# Patient Record
Sex: MALE | Race: WHITE | HISPANIC OR LATINO | URBAN - METROPOLITAN AREA
[De-identification: names, ages, dates, MRNs, and addresses within clinical notes are randomized per-mention and may not be internally consistent; named-entity substitution may affect disease eponyms.]

---

## 2023-01-19 ENCOUNTER — OFFICE VISIT (OUTPATIENT)
Dept: FAMILY MEDICINE CLINIC | Facility: CLINIC | Age: 38
End: 2023-01-19

## 2023-01-19 VITALS
BODY MASS INDEX: 28.7 KG/M2 | HEART RATE: 92 BPM | WEIGHT: 189.4 LBS | SYSTOLIC BLOOD PRESSURE: 122 MMHG | TEMPERATURE: 96.9 F | RESPIRATION RATE: 18 BRPM | HEIGHT: 68 IN | OXYGEN SATURATION: 99 % | DIASTOLIC BLOOD PRESSURE: 70 MMHG

## 2023-01-19 DIAGNOSIS — Z13.1 SCREENING FOR DIABETES MELLITUS (DM): ICD-10-CM

## 2023-01-19 DIAGNOSIS — R03.0 ELEVATED BLOOD PRESSURE READING: ICD-10-CM

## 2023-01-19 DIAGNOSIS — Z11.59 NEED FOR HEPATITIS C SCREENING TEST: ICD-10-CM

## 2023-01-19 DIAGNOSIS — K21.9 GASTROESOPHAGEAL REFLUX DISEASE, UNSPECIFIED WHETHER ESOPHAGITIS PRESENT: Primary | ICD-10-CM

## 2023-01-19 DIAGNOSIS — Z11.4 SCREENING FOR HIV (HUMAN IMMUNODEFICIENCY VIRUS): ICD-10-CM

## 2023-01-19 DIAGNOSIS — Z13.220 SCREENING FOR HYPERLIPIDEMIA: ICD-10-CM

## 2023-01-19 RX ORDER — OMEPRAZOLE 20 MG/1
20 CAPSULE, DELAYED RELEASE ORAL DAILY
Qty: 90 CAPSULE | Refills: 0 | Status: SHIPPED | OUTPATIENT
Start: 2023-01-19

## 2023-01-19 NOTE — ASSESSMENT & PLAN NOTE
Assessment:   · Blood pressure at today’s office visit 122/80 mmHg, controlled  · Blood Pressure goal as per JNC-8 less than 140/90 mmHg,       Plan:   · Will continue to monitor BP  · BP goals reviewed with patient  · Educate on monitoring  blood pressure at home daily in a quiet room; after resting for at least 5 minutes and to bring the logs at next visit  · Will recommend performing aerobic exercise for at least more than 3 times per week  · Will recommend sodium and fat reduction and  Increase  in fruit consumption  · Reassess BP at next visit in 3 months

## 2023-01-19 NOTE — PROGRESS NOTES
Name: Augustus Dorantes      : 1985      MRN: [id number]  Encounter Provider: Alvaro Jones MD  Encounter Date: 2023   Encounter department: [address] [place] [address]     1  Gastroesophageal reflux disease, unspecified whether esophagitis present  Assessment & Plan:  Patient has chronic history of GERD  · Currently not treated  · Positive for nausea, dyspepsia, bloating heartburn and acid regurgitation, abdominal tenderness to palpation  · No red flags  DDX:  · Acute coronary syndrome: negative for dyspoiesis, dyspnea, the pain does not radiates to the jaw, arm, or neck  · Stable angina: pain is not precipitated by exertion or relieved by rest, currently asymptomatic, Hx of GERD, is nonsmoker, bp wnl  · Peptic ulcer disease: denies hx of sick contact, positive test for H  Pylori, pain occurs with meals  Treatment:   · The patient was encourage to continue his lifestyle modifications that includes don loss, healthy meals low in fat, cont  head of bed elevation, avoidance of late night meals  · Advised to raise the HOB, 6 inches, at night  The legs of th head board can be raised using books, blocks or bricks  · Will start a trial of omeprazole 20 mg qd po  · Reassess in 3 months  · H  Pylori test ordered  · Ef symptoms does not resolve, consider increasing medication vs EGD referral   · Reassess in 3 months      Orders:  -     omeprazole (PriLOSEC) 20 mg delayed release capsule; Take 1 capsule (20 mg total) by mouth daily  -     Comprehensive metabolic panel; Future  -     H  pylori antigen, stool; Future    2  Screening for diabetes mellitus (DM)  -     Comprehensive metabolic panel; Future    3  Screening for hyperlipidemia  -     Lipid panel; Future    4  Need for hepatitis C screening test  -     Hepatitis C antibody; Future    5   Screening for HIV (human immunodeficiency virus)  -     HIV 1/2 AG/AB w Reflex [facility] for 2 yr old and above; Future    6  Elevated blood pressure reading  Assessment & Plan:  Assessment:   · Blood pressure at today’s office visit 122/80 mmHg, controlled  · Blood Pressure goal as per JNC-8 less than 140/90 mmHg,       Plan:   · Will continue to monitor BP  · BP goals reviewed with patient  · Educate on monitoring  blood pressure at home daily in a quiet room; after resting for at least 5 minutes and to bring the logs at next visit  · Will recommend performing aerobic exercise for at least more than 3 times per week  · Will recommend sodium and fat reduction and  Increase  in fruit consumption  · Reassess BP at next visit in 3 months  Depression Screening and Follow-up Plan: Patient was screened for depression during today's encounter  They screened negative with a PHQ-2 score of 0  Subjective      It was a pleasure to see Venancio Menjivar is a 44 y o   male with no medical conditions, who presents to [facility]  Last visit with his PCP was 1 year ago, [place], moved to [place] a year ago  Today's main complain:     1  Acid reflux, please see below  Denies unintentional weight loss, no family hx of cancer/IBD/IBS,  fever, chills, fatigue, weakness, headaches, dizziness, rashes, blurred vision, nausea, palpitation, chest pain, SOB, abdominal pain, urinary changes, bowel changes, legs swelling/pain, sleep problems,  sick contacts or recent travel  No previous colonoscopy/esophagoduodenoscopy, no hx of H  pylori  Patient's medical conditions are stable unless noted otherwise above   Patient has not had any recent hospitalizations, or medical emergencies since last visit  Overall patient reports feeling well  Patient has no further complaints other than what is mentioned in the ROS  Denies tobacco and illicit drug consumption, alcohol consumption usually on weekends socially with friend, 1-2 drinks, he is currently self employed,  lives alone         Heartburn  He complains of abdominal pain (epigastric, currently asymptomatic ) and heartburn  He reports no belching, no chest pain, no choking, no coughing, no dysphagia, no early satiety, no globus sensation, no hoarse voice, no nausea, no sore throat, no stridor, no tooth decay, no water brash or no wheezing  last episode on 12/31/3033  This is a chronic problem  The current episode started more than 1 month ago  The problem occurs occasionally  The problem has been unchanged  The heartburn duration is several minutes  The heartburn is located in the substernum  The heartburn is of mild intensity  The heartburn does not wake him from sleep  The heartburn does not limit his activity  The heartburn doesn't change with position  The symptoms are aggravated by ETOH  Pertinent negatives include no anemia, fatigue, melena, muscle weakness, orthopnea or weight loss  Risk factors include ETOH use  He has tried nothing for the symptoms  Past procedures do not include an abdominal ultrasound, an EGD, esophageal manometry, esophageal pH monitoring, H  pylori antibody titer or a UGI  Past invasive treatments do not include gastroplasty, gastroplication or reflux surgery  Review of Systems   Constitutional: Negative for activity change, appetite change, chills, fatigue, fever and weight loss  HENT: Negative for congestion, hoarse voice, postnasal drip, rhinorrhea and sore throat  Eyes: Negative for visual disturbance  Respiratory: Negative for cough, choking, chest tightness, shortness of breath and wheezing  Cardiovascular: Negative for chest pain, palpitations and leg swelling  Gastrointestinal: Positive for abdominal pain (epigastric, currently asymptomatic ) and heartburn  Negative for abdominal distention, blood in stool, constipation, diarrhea, dysphagia, melena, nausea and vomiting  Genitourinary: Negative for difficulty urinating, dysuria and hematuria     Musculoskeletal: Negative for arthralgias, myalgias and muscle weakness  Skin: Negative for rash  Neurological: Negative for dizziness, syncope, weakness, light-headedness, numbness and headaches  Psychiatric/Behavioral: Negative for agitation, behavioral problems, self-injury, sleep disturbance and suicidal ideas  No current outpatient medications on file prior to visit  Objective     /70 (BP Location: Left arm, Patient Position: Sitting, Cuff Size: Standard)   Pulse 92   Temp (!) 96 9 °F (36 1 °C) (Temporal)   Resp 18   Ht 5' 8" (1 727 m)   Wt 85 9 kg (189 lb 6 4 oz)   SpO2 99%   BMI 28 80 kg/m²     Physical Exam  Vitals and nursing note reviewed  Constitutional:       General: He is awake  He is not in acute distress  Appearance: Normal appearance  He is well-developed, well-groomed and overweight  He is not ill-appearing, toxic-appearing or diaphoretic  HENT:      Head: Normocephalic and atraumatic  Nose: Nose normal  No congestion or rhinorrhea  Mouth/Throat:      Mouth: Mucous membranes are moist       Pharynx: No oropharyngeal exudate or posterior oropharyngeal erythema  Eyes:      General: No scleral icterus  Extraocular Movements: Extraocular movements intact  Pupils: Pupils are equal, round, and reactive to light  Cardiovascular:      Rate and Rhythm: Normal rate and regular rhythm  No extrasystoles are present  Pulses:           Radial pulses are 2+ on the right side and 2+ on the left side  Heart sounds: Normal heart sounds, S1 normal and S2 normal  No murmur heard  No friction rub  No gallop  Pulmonary:      Effort: Pulmonary effort is normal  No respiratory distress  Breath sounds: Normal breath sounds and air entry  Abdominal:      General: Abdomen is flat and protuberant  Bowel sounds are normal  There is no distension  Palpations: Abdomen is soft  There is no shifting dullness, fluid wave, hepatomegaly, splenomegaly, mass or pulsatile mass  Tenderness:  There is no abdominal tenderness  There is no right CVA tenderness, left CVA tenderness, guarding or rebound  Negative signs include Mixon's sign  Musculoskeletal:         General: No swelling, tenderness, deformity or signs of injury  Normal range of motion  Cervical back: Normal range of motion and neck supple  Right lower leg: No edema  Left lower leg: No edema  Feet:      Right foot:      Skin integrity: No ulcer, skin breakdown, erythema, warmth, callus or dry skin  Left foot:      Skin integrity: No ulcer, skin breakdown, erythema, warmth, callus or dry skin  Skin:     General: Skin is warm  Findings: No rash  Neurological:      General: No focal deficit present  Mental Status: He is alert  Psychiatric:         Behavior: Behavior is cooperative         Melinda Cervantes MD

## 2023-01-19 NOTE — ASSESSMENT & PLAN NOTE
Patient has chronic history of GERD  · Currently not treated  · Positive for nausea, dyspepsia, bloating heartburn and acid regurgitation, abdominal tenderness to palpation  · No red flags  DDX:  · Acute coronary syndrome: negative for dyspoiesis, dyspnea, the pain does not radiates to the jaw, arm, or neck  · Stable angina: pain is not precipitated by exertion or relieved by rest, currently asymptomatic, Hx of GERD, is nonsmoker, bp wnl  · Peptic ulcer disease: denies hx of sick contact, positive test for H  Pylori, pain occurs with meals  Treatment:   · The patient was encourage to continue his lifestyle modifications that includes don loss, healthy meals low in fat, cont  head of bed elevation, avoidance of late night meals  · Advised to raise the HOB, 6 inches, at night  The legs of th head board can be raised using books, blocks or bricks  · Will start a trial of omeprazole 20 mg qd po  · Reassess in 3 months  · H  Pylori test ordered     · Ef symptoms does not resolve, consider increasing medication vs EGD referral   · Reassess in 3 months